# Patient Record
Sex: MALE | Race: ASIAN | URBAN - METROPOLITAN AREA
[De-identification: names, ages, dates, MRNs, and addresses within clinical notes are randomized per-mention and may not be internally consistent; named-entity substitution may affect disease eponyms.]

---

## 2021-10-29 ENCOUNTER — APPOINTMENT (RX ONLY)
Dept: URBAN - METROPOLITAN AREA CLINIC 60 | Facility: CLINIC | Age: 31
Setting detail: DERMATOLOGY
End: 2021-10-29

## 2021-10-29 DIAGNOSIS — Z41.9 ENCOUNTER FOR PROCEDURE FOR PURPOSES OTHER THAN REMEDYING HEALTH STATE, UNSPECIFIED: ICD-10-CM

## 2021-10-29 PROCEDURE — ? MICRODERMABRASION

## 2021-10-29 ASSESSMENT — LOCATION ZONE DERM
LOCATION ZONE: FACE
LOCATION ZONE: LIP

## 2021-10-29 ASSESSMENT — LOCATION DETAILED DESCRIPTION DERM
LOCATION DETAILED: INFERIOR MID FOREHEAD
LOCATION DETAILED: RIGHT LOWER CUTANEOUS LIP
LOCATION DETAILED: LEFT CENTRAL MALAR CHEEK
LOCATION DETAILED: RIGHT INFERIOR CENTRAL MALAR CHEEK

## 2021-10-29 ASSESSMENT — LOCATION SIMPLE DESCRIPTION DERM
LOCATION SIMPLE: RIGHT CHEEK
LOCATION SIMPLE: INFERIOR FOREHEAD
LOCATION SIMPLE: RIGHT LIP
LOCATION SIMPLE: LEFT CHEEK

## 2021-10-29 NOTE — PROCEDURE: MICRODERMABRASION
Number Of Passes: 1
Endpoint: mild erythema
Indication: skin texture
Detail Level: Generalized
Prep Text: The patient's skin was cleansed w/ clarisonic + zo gentle cleanser, & prepped w/ isopropyl alcohol.\\n\\nThe patient was steamed, extracted & ZO exf polish/acivator was used. ZO soothing mask was applied. Finishing products included SC hydrating b5 gel, ZO hydrating creme, ZO daily sheer SPF, SB interfuse eye gel, & PCA hyaluronic acid boosting serum for the lips.
Vacuum Pressure: 10
Vacuum Pressure Units: inches Hg
Post-Care Instructions: I reviewed with the patient in detail post-care instructions. Advised pt to wear a physical block SPF
Consent: Written consent obtained, risks reviewed including but not limited to crusting, scabbing, blistering, scarring, darker or lighter pigmentary change, bruising, and/or incomplete response.

## 2021-11-30 ENCOUNTER — APPOINTMENT (RX ONLY)
Dept: URBAN - METROPOLITAN AREA CLINIC 60 | Facility: CLINIC | Age: 31
Setting detail: DERMATOLOGY
End: 2021-11-30

## 2021-11-30 DIAGNOSIS — Z41.9 ENCOUNTER FOR PROCEDURE FOR PURPOSES OTHER THAN REMEDYING HEALTH STATE, UNSPECIFIED: ICD-10-CM

## 2021-11-30 PROCEDURE — ? MICRODERMABRASION

## 2021-11-30 ASSESSMENT — LOCATION DETAILED DESCRIPTION DERM
LOCATION DETAILED: LEFT LOWER CUTANEOUS LIP
LOCATION DETAILED: LEFT INFERIOR CENTRAL MALAR CHEEK
LOCATION DETAILED: INFERIOR MID FOREHEAD
LOCATION DETAILED: RIGHT INFERIOR CENTRAL MALAR CHEEK

## 2021-11-30 ASSESSMENT — LOCATION SIMPLE DESCRIPTION DERM
LOCATION SIMPLE: LEFT CHEEK
LOCATION SIMPLE: LEFT LIP
LOCATION SIMPLE: RIGHT CHEEK
LOCATION SIMPLE: INFERIOR FOREHEAD

## 2021-11-30 ASSESSMENT — LOCATION ZONE DERM
LOCATION ZONE: LIP
LOCATION ZONE: FACE

## 2021-11-30 NOTE — PROCEDURE: MICRODERMABRASION
Treatment Number: 1
Detail Level: Generalized
Consent: Written consent obtained, risks reviewed including but not limited to crusting, scabbing, blistering, scarring, darker or lighter pigmentary change, bruising, and/or incomplete response.
Endpoint: mild erythema
Vacuum Pressure: 10
Indication: skin texture
Post-Care Instructions: I reviewed with the patient in detail post-care instructions. Advised pt to wear a physical block SPF
Prep Text: The patient's skin was cleansed w/ clarisonic + zo gentle cleanser, & prepped w/ isopropyl alcohol.\\n\\nThe patient was steamed, extracted & ZO exf polish/acivator was used. ZO soothing mask was applied. Finishing products included SC hydrating b5 gel, ZO hydrating creme, ZO daily sheer SPF, SB interfuse eye gel, & PCA hyaluronic acid boosting serum for the lips.
Vacuum Pressure Units: inches Hg

## 2022-01-10 ENCOUNTER — APPOINTMENT (RX ONLY)
Dept: URBAN - METROPOLITAN AREA CLINIC 60 | Facility: CLINIC | Age: 32
Setting detail: DERMATOLOGY
End: 2022-01-10

## 2022-01-10 DIAGNOSIS — Z41.9 ENCOUNTER FOR PROCEDURE FOR PURPOSES OTHER THAN REMEDYING HEALTH STATE, UNSPECIFIED: ICD-10-CM

## 2022-01-10 PROCEDURE — ? CHEMICAL PEEL

## 2022-01-10 ASSESSMENT — LOCATION SIMPLE DESCRIPTION DERM
LOCATION SIMPLE: LEFT CHEEK
LOCATION SIMPLE: INFERIOR FOREHEAD
LOCATION SIMPLE: CHIN
LOCATION SIMPLE: RIGHT CHEEK

## 2022-01-10 ASSESSMENT — LOCATION DETAILED DESCRIPTION DERM
LOCATION DETAILED: INFERIOR MID FOREHEAD
LOCATION DETAILED: LEFT INFERIOR CENTRAL MALAR CHEEK
LOCATION DETAILED: RIGHT INFERIOR CENTRAL MALAR CHEEK
LOCATION DETAILED: RIGHT CHIN

## 2022-01-10 ASSESSMENT — LOCATION ZONE DERM: LOCATION ZONE: FACE

## 2022-01-10 NOTE — PROCEDURE: CHEMICAL PEEL
Post-Care Instructions: -I reviewed with the patient in detail post-care instructions. Patient should avoid sun exposure and wear sun protection.\\n-I advised the patient to discontinue retinoid use for 1 week post tx \\n-I recommended pt receive nxt tx 4 wks from todays visit.\\n-Sent pt home w/ post-procedure kit & instructions
Prep: Steamed/Cleansed with ZO gentle cleanser/clarisonic. Pt pre-exfoliated with . Extractions were performed with PCA detox gel. Eyes/Nose/Lips occluded w/ SM tx ointment. Prepped w/ alcohol. Neutralized w/ cold saturated compresses. SC phyto corrective mask applied for 5 mins. Finishing products included: SC hydrating b5 gel, Instant pore refiner, ZO daily sheer SPF & skin better interfuse eye gel.
Post Peel Care: Do not wash face for at least 6 hours, no retinoids or exfoliating agents can be used for at least 7 days post peel. No direct sun exposure 1 week post procedure. No rubbing/picking/rolling skin once peeling has started. No abrasive cleansers. Hydrate skin as needed. SPF must be worn daily.\\n\\nPost-Procedure Kit given to patient and instructions for use \\n
Consent: Prior to the procedure, written consent was obtained and risks were reviewed, including but not limited to: redness, peeling, blistering, pigmentary change, scarring, infection, and pain.
Detail Level: Generalized
Number Of Layers: 2
Treatment Number: 0
Booster Applied In Office?: 0.1% Retinol
Chemical Peel: PCA Peel with Hydroquinone and Resourcinol

## 2022-01-31 ENCOUNTER — APPOINTMENT (RX ONLY)
Dept: URBAN - METROPOLITAN AREA CLINIC 60 | Facility: CLINIC | Age: 32
Setting detail: DERMATOLOGY
End: 2022-01-31

## 2022-01-31 DIAGNOSIS — Z41.9 ENCOUNTER FOR PROCEDURE FOR PURPOSES OTHER THAN REMEDYING HEALTH STATE, UNSPECIFIED: ICD-10-CM

## 2022-01-31 PROCEDURE — ? CHEMICAL PEEL

## 2022-01-31 ASSESSMENT — LOCATION SIMPLE DESCRIPTION DERM
LOCATION SIMPLE: CHIN
LOCATION SIMPLE: LEFT FOREHEAD
LOCATION SIMPLE: RIGHT CHEEK
LOCATION SIMPLE: LEFT CHEEK

## 2022-01-31 ASSESSMENT — LOCATION ZONE DERM: LOCATION ZONE: FACE

## 2022-01-31 ASSESSMENT — LOCATION DETAILED DESCRIPTION DERM
LOCATION DETAILED: LEFT INFERIOR MEDIAL FOREHEAD
LOCATION DETAILED: LEFT INFERIOR CENTRAL MALAR CHEEK
LOCATION DETAILED: RIGHT INFERIOR CENTRAL MALAR CHEEK
LOCATION DETAILED: RIGHT CHIN

## 2022-01-31 NOTE — PROCEDURE: CHEMICAL PEEL
Post-Care Instructions: -I reviewed with the patient in detail post-care instructions. Patient should avoid sun exposure and wear sun protection.\\n-I advised the patient to discontinue retinoid use for 1 week post tx \\n-I recommended pt receive nxt tx 4 wks from todays visit.\\n-Sent pt home w/ post-procedure kit & instructions
Treatment Number: 4
Number Of Layers: 1
Prep: Steamed/Cleansed with ZO gentle cleanser. Extractions performed with cotton-tipped applicators/PCA detox gel. Pre-hydrated and masked w/ ZO soothing aloe mask. Eyes/Nose/Lips occluded w/ SM tx ointment. Skin prepped w/ alcohol. Peel applied.\\nFinished with: moisturizer & essential defense tinted SPF
Post Peel Care: Do not wash face for at least 6 hours, no retinoids or exfoliating agents can be used for at least 7 days post peel. No direct sun exposure 1 week post procedure. No rubbing/picking/rolling skin once peeling has started. No abrasive cleansers. Hydrate skin as needed. SPF must be worn daily.\\n\\nPost-Procedure Kit given to patient and instructions for use \\n
Booster Applied In Office?: 0.1% Retinol
Comments: Prepped skin w/ alcohol & microderm w/ smallest tip 2 passes performed.
Detail Level: Generalized
Consent: Prior to the procedure, written consent was obtained and risks were reviewed, including but not limited to: redness, peeling, blistering, pigmentary change, scarring, infection, and pain.
Chemical Peel: Advanced Corrective Peel

## 2022-03-04 ENCOUNTER — APPOINTMENT (RX ONLY)
Dept: URBAN - METROPOLITAN AREA CLINIC 60 | Facility: CLINIC | Age: 32
Setting detail: DERMATOLOGY
End: 2022-03-04

## 2022-03-04 DIAGNOSIS — Z41.9 ENCOUNTER FOR PROCEDURE FOR PURPOSES OTHER THAN REMEDYING HEALTH STATE, UNSPECIFIED: ICD-10-CM

## 2022-03-04 PROCEDURE — ? CHEMICAL PEEL

## 2022-03-04 ASSESSMENT — LOCATION DETAILED DESCRIPTION DERM
LOCATION DETAILED: LEFT INFERIOR CENTRAL MALAR CHEEK
LOCATION DETAILED: RIGHT INFERIOR CENTRAL MALAR CHEEK
LOCATION DETAILED: RIGHT CHIN
LOCATION DETAILED: INFERIOR MID FOREHEAD

## 2022-03-04 ASSESSMENT — LOCATION SIMPLE DESCRIPTION DERM
LOCATION SIMPLE: RIGHT CHEEK
LOCATION SIMPLE: INFERIOR FOREHEAD
LOCATION SIMPLE: CHIN
LOCATION SIMPLE: LEFT CHEEK

## 2022-03-04 ASSESSMENT — LOCATION ZONE DERM: LOCATION ZONE: FACE

## 2022-03-04 NOTE — PROCEDURE: CHEMICAL PEEL
Post-Care Instructions: -I reviewed with the patient in detail post-care instructions. Patient should avoid sun exposure and wear sun protection.\\n-I advised the patient to discontinue retinoid use for 1 week post tx \\n-I recommended pt receive nxt tx 4 wks from todays visit.\\n-Sent pt home w/ post-procedure kit & instructions
Consent: Prior to the procedure, written consent was obtained and risks were reviewed, including but not limited to: redness, peeling, blistering, pigmentary change, scarring, infection, and pain.
Comments: dermaplaned prior to;purifying mask by SM. scrubbed 2 layers peel in aggressively. book [employer] in march to get 20% off ZO Rx brightening protocol products
Number Of Layers: 2
Prep: Steamed/Cleansed with ZO gentle cleanser. Extractions performed with cotton-tipped applicators/PCA detox gel. Pre-hydrated and masked w/ ZO soothing aloe mask. Eyes/Nose/Lips occluded w/ SM tx ointment. Skin prepped w/ alcohol. Peel applied.\\nFinished with: moisturizer & essential defense tinted SPF
Booster Applied In Office?: 0.1% Retinol
Detail Level: Generalized
Chemical Peel: Advanced Corrective Peel
Post Peel Care: Do not wash face for at least 6 hours, no retinoids or exfoliating agents can be used for at least 7 days post peel. No direct sun exposure 1 week post procedure. No rubbing/picking/rolling skin once peeling has started. No abrasive cleansers. Hydrate skin as needed. SPF must be worn daily.\\n\\nPost-Procedure Kit given to patient and instructions for use \\n
Treatment Number: 5